# Patient Record
Sex: FEMALE | Race: WHITE | Employment: OTHER | URBAN - METROPOLITAN AREA
[De-identification: names, ages, dates, MRNs, and addresses within clinical notes are randomized per-mention and may not be internally consistent; named-entity substitution may affect disease eponyms.]

---

## 2017-07-11 ENCOUNTER — OFFICE VISIT (OUTPATIENT)
Dept: PODIATRY | Age: 82
End: 2017-07-11
Payer: MEDICARE

## 2017-07-11 ENCOUNTER — HOSPITAL ENCOUNTER (OUTPATIENT)
Age: 82
Discharge: HOME | End: 2017-07-11
Payer: MEDICARE

## 2017-07-11 ENCOUNTER — HOSPITAL ENCOUNTER (OUTPATIENT)
Dept: GENERAL RADIOLOGY | Age: 82
Discharge: HOME | End: 2017-07-11
Payer: MEDICARE

## 2017-07-11 VITALS
DIASTOLIC BLOOD PRESSURE: 75 MMHG | HEART RATE: 60 BPM | TEMPERATURE: 97.2 F | SYSTOLIC BLOOD PRESSURE: 156 MMHG | HEIGHT: 59 IN

## 2017-07-11 DIAGNOSIS — M77.31 HEEL SPUR, RIGHT: Primary | ICD-10-CM

## 2017-07-11 DIAGNOSIS — M77.30 HEEL SPUR, UNSPECIFIED LATERALITY: ICD-10-CM

## 2017-07-11 DIAGNOSIS — M77.31 HEEL SPUR, RIGHT: ICD-10-CM

## 2017-07-11 DIAGNOSIS — M77.32 HEEL SPUR, LEFT: ICD-10-CM

## 2017-07-11 PROCEDURE — 4004F PT TOBACCO SCREEN RCVD TLK: CPT | Performed by: PODIATRIST

## 2017-07-11 PROCEDURE — 73650 X-RAY EXAM OF HEEL: CPT

## 2017-07-11 PROCEDURE — 1123F ACP DISCUSS/DSCN MKR DOCD: CPT | Performed by: PODIATRIST

## 2017-07-11 PROCEDURE — 4040F PNEUMOC VAC/ADMIN/RCVD: CPT | Performed by: PODIATRIST

## 2017-07-11 PROCEDURE — G8427 DOCREV CUR MEDS BY ELIG CLIN: HCPCS | Performed by: PODIATRIST

## 2017-07-11 PROCEDURE — 99202 OFFICE O/P NEW SF 15 MIN: CPT | Performed by: PODIATRIST

## 2017-07-11 PROCEDURE — G8421 BMI NOT CALCULATED: HCPCS | Performed by: PODIATRIST

## 2017-07-11 PROCEDURE — G8400 PT W/DXA NO RESULTS DOC: HCPCS | Performed by: PODIATRIST

## 2017-07-11 PROCEDURE — 1090F PRES/ABSN URINE INCON ASSESS: CPT | Performed by: PODIATRIST

## 2017-07-11 RX ORDER — ENALAPRIL MALEATE 20 MG/1
20 TABLET ORAL DAILY
COMMUNITY

## 2017-07-11 RX ORDER — FUROSEMIDE 20 MG/1
20 TABLET ORAL DAILY
COMMUNITY

## 2017-07-11 RX ORDER — DIGOXIN 125 MCG
125 TABLET ORAL DAILY
COMMUNITY

## 2017-07-11 RX ORDER — CARVEDILOL 12.5 MG/1
12.5 TABLET ORAL 2 TIMES DAILY WITH MEALS
COMMUNITY

## 2017-07-11 RX ORDER — ACETAMINOPHEN 500 MG
500 TABLET ORAL EVERY 6 HOURS PRN
COMMUNITY

## 2017-07-11 RX ORDER — SIMVASTATIN 20 MG
20 TABLET ORAL NIGHTLY
COMMUNITY

## 2017-07-12 PROBLEM — M77.30 HEEL SPUR: Status: ACTIVE | Noted: 2017-07-12

## 2017-07-18 ENCOUNTER — OFFICE VISIT (OUTPATIENT)
Dept: PODIATRY | Age: 82
End: 2017-07-18
Payer: MEDICARE

## 2017-07-18 VITALS
BODY MASS INDEX: 30.23 KG/M2 | WEIGHT: 154 LBS | SYSTOLIC BLOOD PRESSURE: 151 MMHG | HEART RATE: 71 BPM | TEMPERATURE: 97.8 F | RESPIRATION RATE: 24 BRPM | HEIGHT: 60 IN | DIASTOLIC BLOOD PRESSURE: 75 MMHG

## 2017-07-18 DIAGNOSIS — G57.52 TARSAL TUNNEL SYNDROME, LEFT: ICD-10-CM

## 2017-07-18 DIAGNOSIS — M77.32 HEEL SPUR, LEFT: Primary | ICD-10-CM

## 2017-07-18 DIAGNOSIS — M79.672 PAIN IN LEFT FOOT: ICD-10-CM

## 2017-07-18 PROCEDURE — 1090F PRES/ABSN URINE INCON ASSESS: CPT | Performed by: PODIATRIST

## 2017-07-18 PROCEDURE — 99212 OFFICE O/P EST SF 10 MIN: CPT | Performed by: PODIATRIST

## 2017-07-18 PROCEDURE — 1036F TOBACCO NON-USER: CPT | Performed by: PODIATRIST

## 2017-07-18 PROCEDURE — G8417 CALC BMI ABV UP PARAM F/U: HCPCS | Performed by: PODIATRIST

## 2017-07-18 PROCEDURE — G8427 DOCREV CUR MEDS BY ELIG CLIN: HCPCS | Performed by: PODIATRIST

## 2017-07-18 PROCEDURE — G8400 PT W/DXA NO RESULTS DOC: HCPCS | Performed by: PODIATRIST

## 2017-07-18 PROCEDURE — 4040F PNEUMOC VAC/ADMIN/RCVD: CPT | Performed by: PODIATRIST

## 2017-07-18 PROCEDURE — 1123F ACP DISCUSS/DSCN MKR DOCD: CPT | Performed by: PODIATRIST

## 2017-07-26 ENCOUNTER — HOSPITAL ENCOUNTER (OUTPATIENT)
Dept: PHYSICAL THERAPY | Age: 82
Setting detail: THERAPIES SERIES
Discharge: HOME | End: 2017-07-26
Payer: MEDICARE

## 2017-07-26 PROCEDURE — 97162 PT EVAL MOD COMPLEX 30 MIN: CPT

## 2017-07-26 PROCEDURE — L3020 FOOT LONGITUD/METATARSAL SUP: HCPCS

## 2017-07-26 PROCEDURE — 97110 THERAPEUTIC EXERCISES: CPT

## 2017-07-26 PROCEDURE — 97140 MANUAL THERAPY 1/> REGIONS: CPT

## 2017-07-26 PROCEDURE — G8978 MOBILITY CURRENT STATUS: HCPCS

## 2017-07-26 PROCEDURE — G8979 MOBILITY GOAL STATUS: HCPCS

## 2017-07-27 ENCOUNTER — HOSPITAL ENCOUNTER (OUTPATIENT)
Dept: PHYSICAL THERAPY | Age: 82
Setting detail: THERAPIES SERIES
Discharge: HOME | End: 2017-07-27
Payer: MEDICARE

## 2017-07-27 PROCEDURE — 97140 MANUAL THERAPY 1/> REGIONS: CPT

## 2017-08-01 ENCOUNTER — HOSPITAL ENCOUNTER (OUTPATIENT)
Dept: PHYSICAL THERAPY | Age: 82
Setting detail: THERAPIES SERIES
Discharge: HOME | End: 2017-08-01
Payer: MEDICARE

## 2017-08-01 PROCEDURE — 97110 THERAPEUTIC EXERCISES: CPT

## 2017-08-01 PROCEDURE — 97140 MANUAL THERAPY 1/> REGIONS: CPT

## 2017-08-03 ENCOUNTER — HOSPITAL ENCOUNTER (OUTPATIENT)
Dept: PHYSICAL THERAPY | Age: 82
Setting detail: THERAPIES SERIES
Discharge: HOME | End: 2017-08-03
Payer: MEDICARE

## 2017-08-03 PROCEDURE — 97140 MANUAL THERAPY 1/> REGIONS: CPT

## 2017-08-03 PROCEDURE — 97110 THERAPEUTIC EXERCISES: CPT

## 2017-08-03 ASSESSMENT — PAIN SCALES - GENERAL: PAINLEVEL_OUTOF10: 4

## 2017-08-08 ENCOUNTER — APPOINTMENT (OUTPATIENT)
Dept: PHYSICAL THERAPY | Age: 82
End: 2017-08-08
Payer: MEDICARE

## 2017-08-10 ENCOUNTER — HOSPITAL ENCOUNTER (OUTPATIENT)
Dept: PHYSICAL THERAPY | Age: 82
Setting detail: THERAPIES SERIES
Discharge: HOME | End: 2017-08-10
Payer: MEDICARE

## 2017-08-10 PROCEDURE — 97140 MANUAL THERAPY 1/> REGIONS: CPT

## 2017-08-10 PROCEDURE — 97110 THERAPEUTIC EXERCISES: CPT

## 2017-08-10 ASSESSMENT — PAIN SCALES - GENERAL: PAINLEVEL_OUTOF10: 3

## 2017-08-15 ENCOUNTER — HOSPITAL ENCOUNTER (OUTPATIENT)
Dept: PHYSICAL THERAPY | Age: 82
Setting detail: THERAPIES SERIES
Discharge: HOME | End: 2017-08-15
Payer: MEDICARE

## 2017-08-15 PROCEDURE — 97140 MANUAL THERAPY 1/> REGIONS: CPT

## 2017-08-15 PROCEDURE — 97110 THERAPEUTIC EXERCISES: CPT

## 2017-08-16 ENCOUNTER — HOSPITAL ENCOUNTER (OUTPATIENT)
Dept: PHYSICAL THERAPY | Age: 82
Setting detail: THERAPIES SERIES
Discharge: HOME | End: 2017-08-16
Payer: MEDICARE

## 2017-08-16 PROCEDURE — 97110 THERAPEUTIC EXERCISES: CPT

## 2017-08-16 PROCEDURE — 97140 MANUAL THERAPY 1/> REGIONS: CPT

## 2017-08-17 ENCOUNTER — APPOINTMENT (OUTPATIENT)
Dept: PHYSICAL THERAPY | Age: 82
End: 2017-08-17
Payer: MEDICARE

## 2017-08-24 ENCOUNTER — HOSPITAL ENCOUNTER (OUTPATIENT)
Dept: PHYSICAL THERAPY | Age: 82
Setting detail: THERAPIES SERIES
Discharge: HOME | End: 2017-08-24
Payer: MEDICARE

## 2017-08-24 PROCEDURE — 97110 THERAPEUTIC EXERCISES: CPT

## 2017-08-24 PROCEDURE — G8979 MOBILITY GOAL STATUS: HCPCS

## 2017-08-24 PROCEDURE — G8980 MOBILITY D/C STATUS: HCPCS

## 2017-11-29 ENCOUNTER — TELEPHONE (OUTPATIENT)
Dept: PODIATRY | Age: 82
End: 2017-11-29

## 2017-11-29 DIAGNOSIS — M77.30 CALCANEAL SPUR, UNSPECIFIED LATERALITY: Primary | ICD-10-CM

## 2017-12-06 ENCOUNTER — HOSPITAL ENCOUNTER (OUTPATIENT)
Dept: PHYSICAL THERAPY | Age: 82
Setting detail: THERAPIES SERIES
Discharge: HOME | End: 2017-12-06
Payer: MEDICARE

## 2017-12-06 PROCEDURE — 97110 THERAPEUTIC EXERCISES: CPT

## 2017-12-06 PROCEDURE — G8979 MOBILITY GOAL STATUS: HCPCS

## 2017-12-06 PROCEDURE — 97161 PT EVAL LOW COMPLEX 20 MIN: CPT

## 2017-12-06 PROCEDURE — G8978 MOBILITY CURRENT STATUS: HCPCS

## 2017-12-06 ASSESSMENT — PAIN SCALES - GENERAL: PAINLEVEL_OUTOF10: 5

## 2017-12-06 ASSESSMENT — PAIN DESCRIPTION - ORIENTATION: ORIENTATION: LEFT

## 2017-12-06 ASSESSMENT — PAIN DESCRIPTION - LOCATION: LOCATION: FOOT

## 2017-12-06 NOTE — PROGRESS NOTES
Phone: [facility]          Fax: [phone]                      Outpatient Physical Therapy                                                                      Evaluation  Date: 2017  Patient: Jing Sauceda  : 1935  CSN #: [id number]  Referring Practitioner: Carlos Webb DPM    Referral Date : 17     Diagnosis: Calcaneal spur (M77.30)    Treatment Diagnosis: L foot pain, difficulty walking  Onset Date: 17  PT Insurance Information: Medicare  Total # of Visits Approved: 6   Total # of Visits to Date: 1  No Show: 0  Canceled Appointment: 0     Subjective  Subjective: Pt arrives to PT evaluation with L heel pain, which she states she was here a few months ago for similar symptoms. Pt states she had relief with physical therapy in the past but pain has returned after Thanksgiving. Pt states she walked in sandals for awhile and states that she had pain ever since, but is unsure whether that is what caused the pain or not. Pt states she has custom orthotics that she was fit for a couple months ago, but has not been able to wear them due to not fitting into her shoes. Pt states she has heel spurs, and points to L heel as primary source of pain, which is aggravated by walking. Additional Pertinent Hx: Pacemaker  Pain Screening  Patient Currently in Pain: Yes  Pain Assessment  Pain Assessment: 0-10  Pain Level: 5  Pain Location: Foot  Pain Orientation: Left          Objective     Observation/Palpation  Palpation: Mod TTP L medial calcaneus     Strength LLE  Comment: L ankle 4/5          PROM LLE (degrees)  LLE PROM: WNL  LLE General PROM: Min restriction with gastroc/soleus flexibility     Additional Measures  Flexibility: Mod restriction with gastroc/soleus flexibility. Assessment  Assessment: Pt is a 79 y/o female who presents to PT with L heel pain that has been present for about the past month.  Pt was in PT in July for similar symptoms and reports relief with physical therapy interventions in the past. Pt states she has cont to perform home exercise program, as instructed. Pt states she was fit for custom orthotics, but has been unable to wear them due to orthotics not fitting in her new shoes. Pt's shoes were assessed and insoles were removed, allowing orthotics to be placed with proper fit. Pt reports good feedback with standing and walking in orthotics within clinic. Pt was educated on proper wearing schedule for orthotics and instructed to assess for red marks or sore spots frequently as she begins to wear orthotics. Pt demo good understanding. Pt has TTP in L medial calcaneus and min restriction with L gastroc/soleus flexibility. Pt will benefit from skilled PT services to address the above impairments and to work toward established functional goals. Pt in agreement with scheduling 1x/week to assess symptoms with orthotic use as well as for updating HEP. Prognosis: Good        Decision Making: Low Complexity    Patient Education  Educated on proper wearing schedule with orthotics and PT POC. Pt verbalized/demonstrated good understanding:     [x] Yes         [] No, pt required further clarification. [x] Primary Impairment :   G Code:    [x] Mobility         [] Carry        [] Body Position       [] Self Care      [] Other:   Functional Impairment Current:  [] 0%    [x] 1-19% [] 20-39% [] 40-59% [] 60-79%    [] 80-99% [] 100%  Functional Impairment Goal:  [x] 0%    [] 1-19% [] 20-39% [] 40-59% [] 60-79%    [] 80-99% [] 100%  G Code Functional Impairment determined by:  [x] Clinical Judgment   [] Outcome Measure:     Goals  Short term goals  Time Frame for Short term goals: 2 weeks  Short term goal 1: Pt will initiate HEP. Short term goal 2: Pt will be educated on proper wearing schedule of orthotics to decrease pain with walking.   Short term goal 3: Pt will report 25% improvement with pain and overall function for ease of standing and walking tasks. Long term goals  Time Frame for Long term goals : 4 weeks  Long term goal 1: Pt will be independent and compliant with HEP. Long term goal 2: Pt will report 75% improvement in L heel pain with walking for ease of functional tasks.         Patient goals : Decrease pain        Minutes Tracking:  Time In: 1996  Time Out: [address]  Minutes: [id number] [place], [place], [facility]       12/6/2017

## 2017-12-06 NOTE — PLAN OF CARE
[facility]           Phone: [phone]             Outpatient Physical Therapy  Fax: [phone]                                           Date: 2017  Patient: RoseM arie Julian : 1935 [facility] #: [id number]   Referring Practitioner:  Emily Saldivar DPM Referral Date:  17       [x] Plan of Care   [] Updated Plan of Care    Dates of Service to Include: 2017 to 18    Diagnosis:  Calcaneal spur (M77.30)    Rehab (Treatment) Diagnosis:  L foot pain, difficulty walking             Onset Date:  17    Attendance  Total # of Visits to Date: 1 No Show: 0 Canceled Appointment: 0    Assessment  Assessment: Pt is a 79 y/o female who presents to PT with L heel pain that has been present for about the past month. Pt was in PT in July for similar symptoms and reports relief with physical therapy interventions in the past. Pt states she has cont to perform home exercise program, as instructed. Pt states she was fit for custom orthotics, but has been unable to wear them due to orthotics not fitting in her new shoes. Pt's shoes were assessed and insoles were removed, allowing orthotics to be placed with proper fit. Pt reports good feedback with standing and walking in orthotics within clinic. Pt was educated on proper wearing schedule for orthotics and instructed to assess for red marks or sore spots frequently as she begins to wear orthotics. Pt demo good understanding. Pt has TTP in L medial calcaneus and min restriction with L gastroc/soleus flexibility. Pt will benefit from skilled PT services to address the above impairments and to work toward established functional goals. Pt in agreement with scheduling 1x/week to assess symptoms with orthotic use as well as for updating HEP.      [x] Primary Impairment :   G Code:    [x] Mobility         [] Carry        [] Body Position       [] Self Care      [] Other:   Functional Impairment Current:  [] 0%    [x] 1-19% [] 20-39% [] 40-59% [] 60-79%    [] 80-99% [] 100%  Functional Impairment Goal:  [x] 0%    [] 1-19% [] 20-39% [] 40-59% [] 60-79%    [] 80-99% [] 100%  G Code Functional Impairment determined by:  [x] Clinical Judgment   [] Outcome Measure:     Goals  Short term goals  Time Frame for Short term goals: 2 weeks  Short term goal 1: Pt will initiate HEP. Short term goal 2: Pt will be educated on proper wearing schedule of orthotics to decrease pain with walking. Short term goal 3: Pt will report 25% improvement with pain and overall function for ease of standing and walking tasks. Short term goal 4: -  Long term goals  Time Frame for Long term goals : 4 weeks  Long term goal 1: Pt will be independent and compliant with HEP. Long term goal 2: Pt will report 75% improvement in L heel pain with walking for ease of functional tasks.   Long term goal 3: -  Long term goal 4: -    Prognosis  Prognosis: Good    Treatment Plan   Times per week: 1  Plan weeks: 6  [x]HP/CP      []Electrical Stim   [x]Therapeutic Exercise      []Gait Training  []Aquatics   []Ultrasound         [x]Patient Education/HEP   [x]Manual Therapy  []Traction    []Neuro-julia        [x]Soft Tissue Mobs            []Home TENS  []Iontophoresis    []Orthotic casting/fitting      []Dry Needling             Electronically signed by: Medardo Kumar PT, DPT    Date: 12/6/2017      ______________________________________ Date: 12/6/2017   Physician Signature

## 2017-12-12 ENCOUNTER — HOSPITAL ENCOUNTER (OUTPATIENT)
Dept: PHYSICAL THERAPY | Age: 82
Setting detail: THERAPIES SERIES
Discharge: HOME | End: 2017-12-12
Payer: MEDICARE

## 2017-12-12 PROCEDURE — 97140 MANUAL THERAPY 1/> REGIONS: CPT

## 2017-12-12 PROCEDURE — 97110 THERAPEUTIC EXERCISES: CPT

## 2017-12-12 ASSESSMENT — PAIN DESCRIPTION - ORIENTATION: ORIENTATION: LEFT

## 2017-12-12 ASSESSMENT — PAIN SCALES - GENERAL: PAINLEVEL_OUTOF10: 2

## 2017-12-12 ASSESSMENT — PAIN DESCRIPTION - LOCATION: LOCATION: ANKLE

## 2017-12-12 NOTE — PROGRESS NOTES
Phone: [phone]                 [facility]           Fax: [phone]                           Outpatient Physical Therapy                                                                            Daily Note    Patient: Clarita Pierre : 1935  CSN #: [id number]   Referring Practitioner:  Rebekah Malagon DPM    Referral Date : 17     Date: 2017    Diagnosis: Calcaneal spur (M77.30)  Treatment Diagnosis: L foot pain, difficulty walking    Onset Date: 17  PT Insurance Information: Medicare  Total # of Visits Approved: 6 Per Physician Order  Total # of Visits to Date: 2  No Show: 0  Canceled Appointment: 0      Pre-Treatment Pain:  2/10  Subjective: pt reported her L foot is feeling better and she notices an improvement in tolerance to walking. Current pain 1-2/10. Exercises:  Exercise 2: towel scrunches vega - 1minx2   Exercise 5: Seated baps CW, CCW, F/W 1min each  Exercise 6: Rockerboard fwd + s/s 1min each  Exercise 8: Tennis ball 3min    Manual:  Soft Tissue Mobalization: soft tissue massage to PF and arch     Modalities:  Moist heat: HP x 10 mins sitting     Assessment  Assessment: Initiated exercises this date to improve strength and mobility. Pt tolerated tx well with no increase in pain. manual STM to decrease stiffness. Patient Education  Instructed pt on therex this date--pt able to demonstrate properly. Pt verbalized/demonstrated good understanding:     [x] Yes         [] No, pt required further clarification. Post Treatment Pain:  0/10    Plan  Times per week: 1  Plan weeks: 6    Goals  (Total # of Visits to Date: 2)   Short Term Goals - Time Frame for Short term goals: 2 weeks   Short term goal 1: Pt will initiate HEP. [x]Met  []Partially met  []Not met   Short term goal 2: Pt will be educated on proper wearing schedule of orthotics to decrease pain with walking.   []Met  []Partially met  []Not met   Short term goal 3: Pt will report 25% improvement with pain and overall function for ease of standing and walking tasks. []Met  []Partially met  []Not met      []Met  []Partially met  []Not met     Long Term Goals - Time Frame for Long term goals : 4 weeks  Long term goal 1: Pt will be independent and compliant with HEP. []Met  []Partially met  []Not met   Long term goal 2: Pt will report 75% improvement in L heel pain with walking for ease of functional tasks.  []Met  []Partially met  []Not met     []Met  []Partially met  []Not met     []Met  []Partially met  []Not met     []Met  []Partially met  []Not met       Minutes Tracking:  Time In: 7304  Time Out: 1104  Minutes: [place] DAVE [id number] Date: 12/12/2017

## 2017-12-20 ENCOUNTER — HOSPITAL ENCOUNTER (OUTPATIENT)
Dept: PHYSICAL THERAPY | Age: 82
Setting detail: THERAPIES SERIES
Discharge: HOME | End: 2017-12-20
Payer: MEDICARE

## 2017-12-20 PROCEDURE — 97110 THERAPEUTIC EXERCISES: CPT

## 2017-12-20 PROCEDURE — 97140 MANUAL THERAPY 1/> REGIONS: CPT

## 2017-12-20 PROCEDURE — G8978 MOBILITY CURRENT STATUS: HCPCS

## 2017-12-20 PROCEDURE — G8980 MOBILITY D/C STATUS: HCPCS

## 2017-12-20 PROCEDURE — G8979 MOBILITY GOAL STATUS: HCPCS

## 2018-07-27 NOTE — DISCHARGE SUMMARY
Phone: [facility]          Fax: [phone]                            Outpatient Physical Therapy                                                                    Discharge Summary    Patient: Safia Melton  : 1935  CSN #: [id number]   Referring physician: No admitting provider for patient encounter. Referring Practitioner: Delwyn Sandifer, DPM      Diagnosis: Calcaneal spur (M77.30)      Date Treatment Initiated: 17  Date of Last Treatment: 17      PT Visit Information  Onset Date: 17  PT Insurance Information: Medicare  Total # of Visits Approved: 6  Total # of Visits to Date: 3  Plan of Care/Certification Expiration Date: 18  No Show: 0  Canceled Appointment: 0  Progress Note Counter: Adriana Currie at eval      Frequency/Duration   1 times per week   3 weeks      Treatment Received  [x]HP/CP      []Electrical Stim   [x]Therapeutic Exercise      []Gait Training  []Aquatics   []Ultrasound         [x]Patient Education/HEP   [x]Manual Therapy  []Traction    []Neuro-julia        [x]Soft Tissue Mobs            []Home TENS  []Iontophoresis    []Orthotic casting/fitting      []Dry Needling    Assessment  Assessment: At pt's last visit on 18, Pt reported 90% improvement and requesting to be placed on two week hold. No concerns/issues reported by patient within that time; therefore, will D/C from PT. Goals  Short term goals  Time Frame for Short term goals: 2 weeks  Short term goal 1: Pt will initiate HEP. -met  Short term goal 2: Pt will be educated on proper wearing schedule of orthotics to decrease pain with walking.-met  Short term goal 3: Pt will report 25% improvement with pain and overall function for ease of standing and walking tasks. -met    Long term goals  Time Frame for Long term goals : 4 weeks  Long term goal 2: Pt will report 75% improvement in L heel pain with walking for ease of functional tasks. -met      Reason for Discharge  [x] Goals Achieved                       [] Poor Follow Through/Attendance                  [] Optimal Function Achieved     [] Patient Discharged Self    [] Hospitalization                         [] Physician discharge      Thank you for this referral      Sonido Oliva PT, DPT                    Date: 7/27/2018